# Patient Record
Sex: FEMALE | Race: WHITE | NOT HISPANIC OR LATINO
[De-identification: names, ages, dates, MRNs, and addresses within clinical notes are randomized per-mention and may not be internally consistent; named-entity substitution may affect disease eponyms.]

---

## 2021-06-22 PROBLEM — Z00.00 ENCOUNTER FOR PREVENTIVE HEALTH EXAMINATION: Status: ACTIVE | Noted: 2021-06-22

## 2021-07-01 ENCOUNTER — APPOINTMENT (OUTPATIENT)
Dept: SURGERY | Facility: CLINIC | Age: 56
End: 2021-07-01
Payer: MEDICAID

## 2021-07-01 VITALS
TEMPERATURE: 97.7 F | HEIGHT: 60 IN | DIASTOLIC BLOOD PRESSURE: 71 MMHG | SYSTOLIC BLOOD PRESSURE: 107 MMHG | BODY MASS INDEX: 27.48 KG/M2 | WEIGHT: 140 LBS | HEART RATE: 69 BPM

## 2021-07-01 DIAGNOSIS — R19.09 OTHER INTRA-ABDOMINAL AND PELVIC SWELLING, MASS AND LUMP: ICD-10-CM

## 2021-07-01 DIAGNOSIS — K35.80 UNSPECIFIED ACUTE APPENDICITIS: ICD-10-CM

## 2021-07-01 PROCEDURE — 99024 POSTOP FOLLOW-UP VISIT: CPT

## 2021-07-01 NOTE — HISTORY OF PRESENT ILLNESS
[de-identified] : 57 yo F s/p laparoscopic appendectomy on 6/15 for perforated appendicitis.  Pathology showing acute appendicitis with periappendicitis.   [de-identified] : She has been doing well since surgery.  Ambulating and tolerating a regular diet with normal bowel function.   Pt c/o growing groin lump which she feels larger when standing.  It is not painful.  Denies N/V, fevers/chills.

## 2021-07-01 NOTE — CONSULT LETTER
[Dear  ___] : Dear  [unfilled], [Referral Letter:] : I am referring [unfilled] to you for further evaluation.  My most recent evaluation follows. [Referral Closing:] : Thank you very much for seeing this patient.  If you have any questions, please do not hesitate to contact me. [Sincerely,] : Sincerely, [FreeTextEntry1] : I took care of Ms Sim for acute appendictis.  I am working up a right groin lump (hernia vs soft tissue) and I am referring her to you for primary care.   [FreeTextEntry3] : Samara Kim

## 2021-07-01 NOTE — PHYSICAL EXAM
[de-identified] : NAD [de-identified] : soft, NT/ND, incisions well healed. [de-identified] : Right groin with 1 cm palpable mobile mass, no definite inguinal hernia

## 2021-07-14 ENCOUNTER — APPOINTMENT (OUTPATIENT)
Dept: SURGERY | Facility: CLINIC | Age: 56
End: 2021-07-14
Payer: MEDICAID

## 2021-07-14 DIAGNOSIS — K40.90 UNILATERAL INGUINAL HERNIA, W/OUT OBSTRUCTION OR GANGRENE, NOT SPECIFIED AS RECURRENT: ICD-10-CM

## 2021-07-14 PROCEDURE — 99024 POSTOP FOLLOW-UP VISIT: CPT

## 2021-07-14 NOTE — PHYSICAL EXAM
[de-identified] : NAD [de-identified] : soft, NT/ND [de-identified] : Right groin with 1 cm palpable mobile mass, no definite inguinal hernia.

## 2021-07-14 NOTE — HISTORY OF PRESENT ILLNESS
[de-identified] : Follow up after right groin US showing fat containing right inguinal hernia.   [de-identified] : 55 yo F s/p laparoscopic appendectomy on 6/15 for perforated appendicitis.  At her post-op visit patient complaints of growing groin lump which she feels larger when standing. It is not painful.  An US shows a fat containing inguinal hernia.  She denies abdominal pain, NV, diarrhea, constipation or obstipation.

## 2024-04-09 ENCOUNTER — TRANSCRIPTION ENCOUNTER (OUTPATIENT)
Age: 59
End: 2024-04-09

## 2025-09-08 ENCOUNTER — APPOINTMENT (OUTPATIENT)
Age: 60
End: 2025-09-08
Payer: COMMERCIAL

## 2025-09-08 VITALS
OXYGEN SATURATION: 95 % | HEIGHT: 59.06 IN | SYSTOLIC BLOOD PRESSURE: 104 MMHG | BODY MASS INDEX: 29.48 KG/M2 | DIASTOLIC BLOOD PRESSURE: 68 MMHG | WEIGHT: 146.2 LBS | HEART RATE: 75 BPM

## 2025-09-08 DIAGNOSIS — Z82.49 FAMILY HISTORY OF ISCHEMIC HEART DISEASE AND OTHER DISEASES OF THE CIRCULATORY SYSTEM: ICD-10-CM

## 2025-09-08 DIAGNOSIS — Z83.438 FAMILY HISTORY OF OTHER DISORDER OF LIPOPROTEIN METABOLISM AND OTHER LIPIDEMIA: ICD-10-CM

## 2025-09-08 DIAGNOSIS — R73.03 PREDIABETES.: ICD-10-CM

## 2025-09-08 DIAGNOSIS — E66.3 OVERWEIGHT: ICD-10-CM

## 2025-09-08 DIAGNOSIS — R74.01 ELEVATION OF LEVELS OF LIVER TRANSAMINASE LEVELS: ICD-10-CM

## 2025-09-08 DIAGNOSIS — E03.8 OTHER SPECIFIED HYPOTHYROIDISM: ICD-10-CM

## 2025-09-08 PROCEDURE — 99205 OFFICE O/P NEW HI 60 MIN: CPT

## 2025-09-08 RX ORDER — TIRZEPATIDE 2.5 MG/.5ML
2.5 INJECTION, SOLUTION SUBCUTANEOUS
Qty: 4 | Refills: 1 | Status: COMPLETED | COMMUNITY
Start: 2025-09-08 | End: 2025-09-08

## 2025-09-09 ENCOUNTER — RX CHANGE (OUTPATIENT)
Age: 60
End: 2025-09-09

## 2025-09-10 RX ORDER — SEMAGLUTIDE 0.25 MG/.5ML
0.25 INJECTION, SOLUTION SUBCUTANEOUS
Qty: 1 | Refills: 1 | Status: COMPLETED | COMMUNITY
Start: 2025-09-08 | End: 2025-09-10

## 2025-09-10 RX ORDER — TIRZEPATIDE 2.5 MG/.5ML
2.5 INJECTION, SOLUTION SUBCUTANEOUS
Qty: 1 | Refills: 1 | Status: ACTIVE | COMMUNITY
Start: 2025-09-10 | End: 1900-01-01